# Patient Record
Sex: FEMALE | Race: WHITE
[De-identification: names, ages, dates, MRNs, and addresses within clinical notes are randomized per-mention and may not be internally consistent; named-entity substitution may affect disease eponyms.]

---

## 2017-05-21 ENCOUNTER — HOSPITAL ENCOUNTER (EMERGENCY)
Dept: HOSPITAL 62 - ER | Age: 42
Discharge: HOME | End: 2017-05-21
Payer: MEDICAID

## 2017-05-21 VITALS — DIASTOLIC BLOOD PRESSURE: 87 MMHG | SYSTOLIC BLOOD PRESSURE: 137 MMHG
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
## 2017-05-21 DIAGNOSIS — Z82.5: ICD-10-CM

## 2017-05-21 DIAGNOSIS — R03.0: ICD-10-CM

## 2017-05-21 DIAGNOSIS — R06.02: ICD-10-CM

## 2017-05-21 DIAGNOSIS — F17.210: ICD-10-CM

## 2017-05-21 DIAGNOSIS — R51: ICD-10-CM

## 2017-05-21 DIAGNOSIS — S31.154A: ICD-10-CM

## 2017-05-21 DIAGNOSIS — R06.2: ICD-10-CM

## 2017-05-21 DIAGNOSIS — R05: Primary | ICD-10-CM

## 2017-05-21 DIAGNOSIS — H92.09: ICD-10-CM

## 2017-05-21 DIAGNOSIS — Z91.09: ICD-10-CM

## 2017-05-21 DIAGNOSIS — Z84.89: ICD-10-CM

## 2017-05-21 PROCEDURE — 99283 EMERGENCY DEPT VISIT LOW MDM: CPT

## 2017-05-21 NOTE — ER DOCUMENT REPORT
HPI





- HPI


Patient complains to provider of: bug bite, cough


Onset: Other - bug bite- this am, cough x 1 w


Severity: Moderate


Pain Level: 3


Context: 





Patient presents emergency department with complaints of bug bite to her lower 

abdomen that she noted this morning.  She also reports history of allergy 

induced asthma and has been feeling short of breath with wheezing for about a 

week.  She also reports she has ear pain.  She reports she was seen at the 

urgent care on Friday approximately 1 week ago for knee but did not mention 

wheeze at that time.  She denies other symptoms such as fever vomiting 

diarrhea.  She reports she was at the [employer] and thinks there is a lot of 

dust there and she probably should more think because she has asthma. Reports 

she is using her nebs without relief of sx. Also thinks she may have a sinus 

infection because she has been having headaches on/off.








 


Associated Symptoms: Nonproductive cough, Earache


Exacerbated by: Denies


Relieved by: Denies


Similar symptoms previously: Yes


Recently seen / treated by doctor: Yes





- REPRODUCTIVE


Reproductive: DENIES: Pregnant:





- DERM


Skin Color: Normal





Past Medical History





- General


Information source: Patient


Last Menstrual Period: irregular





- Social History


Smoking Status: Current Every Day Smoker


Cigarette use (# per day): Yes


Chew tobacco use (# tins/day): No


Frequency of alcohol use: None


Drug Abuse: None


Occupation: [employer]


Lives with: Family


Family History: Reviewed & Not Pertinent, Hypertension, Malignancy, Other - 

Sister has allergy and asthma


Patient has suicidal ideation: No


Patient has homicidal ideation: No





- Past Medical History


Cardiac Medical History: Reports: Hx Hypertension


Pulmonary Medical History: Reports: Hx Asthma, Hx Bronchitis


Endocrine Medical History: Reports: Hx Diabetes Mellitus Type 2 - borderline


Renal/ Medical History: Reports: Hx Kidney Stones.  Denies: Hx Peritoneal 

Dialysis


Musculoskeltal Medical History: Reports Hx Arthritis, Reports Hx 

Musculoskeletal Deformity


Past Surgical History: Reports: Hx Kidney (Renal Surgery) - lithotripsy





- Immunizations


Immunizations up to date: Yes


Hx Diphtheria, Pertussis, Tetanus Vaccination: No - >5 yrs





Vertical Provider Document





- CONSTITUTIONAL


Agree With Documented VS: Yes


Exam Limitations: No Limitations


General Appearance: WD/WN, No Apparent Distress - nontoxic looking





- INFECTION CONTROL


TRAVEL OUTSIDE OF THE [place] IN LAST 30 DAYS: No





- HEENT


HEENT: Atraumatic, Normocephalic.  negative: Conjuctival Injection, Pharyngeal 

Exudate, Pharyngeal Erythema - No peritonsillar abscess good clear voice no 

trismus, Tympanic Membrane Red, Tympanic Membrane Bulging





- NECK


Neck: Normal Inspection, Supple.  negative: Lymphadenopathy-Left, 

Lymphadenopathy-Right





- RESPIRATORY


Respiratory: Breath Sounds Normal, No Respiratory Distress - No cough noted 

during entire interview and assessment respiratory rate even and unlabored no 

shortness of breath no wheezing no rhonchi speaks in a clear voice, sentences 

with out difficulty,.  negative: Rhonchi, Wheezing


O2 Sat by Pulse Oximetry: 99





- CARDIOVASCULAR


Cardiovascular: Regular Rate, Regular Rhythm





- GI/ABDOMEN


Gastrointestinal: Abdomen Soft, Abdomen Non-Tender





- MUSCULOSKELETAL/EXTREMETIES


Musculoskeletal/Extremeties: MASONIYA, FROM





- NEURO


Level of Consciousness: Awake, Alert, Appropriate


Motor/Sensory: No Motor Deficit





- DERM


Integumentary: Warm, Dry


Adult Front & Back Diagram: 


  __________________________














  __________________________





 1 - Erythema noted ~ 1 cmm round, no pustule, no induration no warmth or 

swelling no drainage








Course





- Re-evaluation


Re-evalutation: 





05/21/17  


Pt instructed on importance of quit smoking.  Patient also instructed to 

monitor her insect bite for signs and symptoms of abscess.  She verbalized 

understanding.  She also was instructed to follow-up with primary care provider 

this week.  She verbalized understanding.





- Vital Signs


Vital signs: 


 











Temp Pulse Resp BP Pulse Ox


 


 97.8 F   90   18   137/87 H  99 


 


 05/21/17 10:32  05/21/17 10:32  05/21/17 10:32  05/21/17 10:32  05/21/17 10:32














Discharge





- Discharge


Clinical Impression: 


 Cough, Elevated blood pressure reading





Bug bite


Qualifiers:


 Encounter type: initial encounter Qualified Code(s): W57.XXXA - Bitten or 

stung by nonvenomous insect and other nonvenomous arthropods, initial encounter





Condition: Stable


Disposition: HOME, SELF-CARE


Instructions:  Insect Bites ([facility])


Additional Instructions: 


*You have been treated for a insect bite and cough, history of allergy induced 

asthma, elevated blood pressure reading


*Use your inhaler as prescribed, take antihistamines as indicated


*Monitor the site for signs of  infection such as   pain, redness, swelling, 

warmth


*Keep the insect bite site clean, apply bacitracin


*Follow up with your primary care provider within one week


*Return to ED for signs of increasing infection, worsening condition,


  changes, needs














Monitor your blood pressure. Your blood pressure was elevated today.  This may 

be because you were anxious, in pain or because you need medication.  It is 

important to follow up with your primary care provider for full evaluation.


Forms:  Elevated Blood Pressure


Referrals: 


ZULMA ARIZMENID MD [Primary Care Provider] - Follow up as needed

## 2017-07-18 ENCOUNTER — HOSPITAL ENCOUNTER (EMERGENCY)
Dept: HOSPITAL 62 - ER | Age: 42
Discharge: HOME | End: 2017-07-18
Payer: COMMERCIAL

## 2017-07-18 VITALS — DIASTOLIC BLOOD PRESSURE: 85 MMHG | SYSTOLIC BLOOD PRESSURE: 135 MMHG

## 2017-07-18 DIAGNOSIS — Z87.891: ICD-10-CM

## 2017-07-18 DIAGNOSIS — J45.909: ICD-10-CM
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
## 2017-07-18 DIAGNOSIS — I10: ICD-10-CM

## 2017-07-18 DIAGNOSIS — S05.91XA: Primary | ICD-10-CM
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
## 2017-07-18 PROCEDURE — 99283 EMERGENCY DEPT VISIT LOW MDM: CPT

## 2017-07-18 NOTE — ER DOCUMENT REPORT
HPI





- HPI


Patient complains to provider of: poked eye with balloon edge


Onset: Just prior to arrival


Onset/Duration: Sudden


Pain Level: 3


Context: 


40 yo female poked right eye with mular unblown up balloon edge prior to 

arrival causing eye discomfort. Contact lense wearer but not today. NKA.


Associated Symptoms: None


Exacerbated by: Denies


Relieved by: Denies


Similar symptoms previously: No


Recently seen / treated by doctor: No





- ROS


ROS below otherwise negative: Yes


Systems Reviewed and Negative: Yes All other systems reviewed and negative





- CARDIOVASCULAR


Cardiovascular: DENIES: Chest pain





- REPRODUCTIVE


LMP: 6/27/17


Reproductive: DENIES: Pregnant:





Past Medical History





- General


Information source: Patient





- Social History


Smoking Status: Former Smoker


Frequency of alcohol use: None


Drug Abuse: None


Lives with: Family


Family History: Reviewed & Not Pertinent, Hypertension, Malignancy, Other - 

Sister has allergy and asthma





- Past Medical History


Cardiac Medical History: Reports: Hx Hypertension


Pulmonary Medical History: Reports: Hx Asthma, Hx Bronchitis


Endocrine Medical History: Reports: Hx Diabetes Mellitus Type 2 - borderline


Renal/ Medical History: Reports: Hx Kidney Stones.  Denies: Hx Peritoneal 

Dialysis


Musculoskeltal Medical History: Reports Hx Arthritis, Reports Hx 

Musculoskeletal Deformity


Past Surgical History: Reports: Hx Kidney (Renal Surgery) - lithotripsy





- Immunizations


Immunizations up to date: Yes


Hx Diphtheria, Pertussis, Tetanus Vaccination: No - >5 yrs





Vertical Provider Document





- CONSTITUTIONAL


Agree With Documented VS: Yes


Exam Limitations: No Limitations


General Appearance: No Apparent Distress





- INFECTION CONTROL


TRAVEL OUTSIDE OF THE [place] IN LAST 30 DAYS: No





- HEENT


HEENT: Atraumatic, PERRLA.  negative: Conjuctival Injection


Notes: 


no fb, no fluorescein uptake, anterior chamber normal.





- NECK


Neck: Supple





- RESPIRATORY


O2 Sat by Pulse Oximetry: 95





- NEURO


Level of Consciousness: Awake, Alert, Appropriate





- DERM


Integumentary: Warm, Dry, No Rash





Course





- Vital Signs


Vital signs: 


 











Temp Pulse Resp BP Pulse Ox


 


 98.4 F   79   16   137/89 H  95 


 


 07/18/17 13:20  07/18/17 13:20  07/18/17 13:20  07/18/17 13:20  07/18/17 13:20














Discharge





- Discharge


Clinical Impression: 


Right eye injury


Qualifiers:


 Encounter type: initial encounter Qualified Code(s): S05.91XA - Unspecified 

injury of right eye and orbit, initial encounter





Condition: Good


Disposition: HOME, SELF-CARE


Instructions:  Eye Injury ([facility]), Eyedrop Use ([facility]), Ketorolac Tromethamine Eye 

Drops ([facility]), Sulfa Medications ([facility])


Additional Instructions: 


no contacts until eye is normal


1 ketorolac eye drop into right eye every 8 hours until gone (1 day)


don't rub eye


eye drops for 2 days


see eye doctor if swelling, red, haziness, persistant problems after 24 hours





Please complete the patient satisfaction survey if you get one, and return it.. 

If you do not receive a survey,  then you can go to the [facility] website, [email or web address] 

and place your comments about your very good care. Thank you very much. It was 

a pleasure being your medical provider today.


Prescriptions: 


Sulfacetamide Sodium [Bleph-10] 2 drop OU QID #5 ml


Referrals: 


ZULMA ARIZMENDI MD [Primary Care Provider] - Follow up as needed


ARIADNA DENNISON MD [ACTIVE STAFF] - Follow up as needed

## 2017-07-18 NOTE — ER DOCUMENT REPORT
ED Medical Screen (RME)





- General


Chief Complaint: Eye Injury


Stated Complaint: RIGHT EYE INJURY


Time Seen by Provider: 07/18/17 13:27


Information source: Patient


Notes: 


41-year-old female who hit her right eye with a deflated edge of a balloon.  

She wears contacts but currently is not wearing any contacts.  She states some 

pain to her right eye.  She denies blurry vision.  Patient's tetanus is up-to-

date.





We will obtain a visual acuity check, slit-lamp and fluorescein check with 

Wood[place] lamp.


TRAVEL OUTSIDE OF THE [place] IN LAST 30 DAYS: No





- Related Data


Allergies/Adverse Reactions: 


 





No Known Allergies Allergy (Verified 05/21/17 10:32)


 











Past Medical History





- Social History


Family history: Reviewed & Not Pertinent





- Past Medical History


Cardiac Medical History: Reports: Hx Hypertension


Pulmonary Medical History: Reports: Hx Asthma, Hx Bronchitis


Endocrine Medical History: Reports: Hx Diabetes Mellitus Type 2 - borderline


Renal/ Medical History: Reports: Hx Kidney Stones.  Denies: Hx Peritoneal 

Dialysis


Musculoskeltal Medical History: Reports Hx Arthritis, Reports Hx 

Musculoskeletal Deformity


Past Surgical History: Reports: Hx Kidney (Renal Surgery) - lithotripsy





- Immunizations


Immunizations up to date: Yes


Hx Diphtheria, Pertussis, Tetanus Vaccination: No - >5 yrs





Physical Exam





- Vital signs


Vitals: 





 











Temp Pulse Resp BP Pulse Ox


 


 98.4 F   79   16   137/89 H  95 


 


 07/18/17 13:20  07/18/17 13:20  07/18/17 13:20  07/18/17 13:20  07/18/17 13:20














Course





- Vital Signs


Vital signs: 





 











Temp Pulse Resp BP Pulse Ox


 


 98.4 F   79   16   137/89 H  95 


 


 07/18/17 13:20  07/18/17 13:20  07/18/17 13:20  07/18/17 13:20  07/18/17 13:20

## 2017-08-28 ENCOUNTER — HOSPITAL ENCOUNTER (EMERGENCY)
Dept: HOSPITAL 62 - ER | Age: 42
Discharge: HOME | End: 2017-08-28
Payer: MEDICAID

## 2017-08-28 VITALS — DIASTOLIC BLOOD PRESSURE: 60 MMHG | SYSTOLIC BLOOD PRESSURE: 118 MMHG

## 2017-08-28 DIAGNOSIS — J06.9: ICD-10-CM

## 2017-08-28 DIAGNOSIS — Z88.7: ICD-10-CM

## 2017-08-28 DIAGNOSIS — R05: ICD-10-CM

## 2017-08-28 DIAGNOSIS — E11.9: ICD-10-CM

## 2017-08-28 DIAGNOSIS — N39.0: Primary | ICD-10-CM

## 2017-08-28 DIAGNOSIS — R10.11: ICD-10-CM

## 2017-08-28 DIAGNOSIS — I10: ICD-10-CM

## 2017-08-28 DIAGNOSIS — Z87.442: ICD-10-CM

## 2017-08-28 DIAGNOSIS — J45.909: ICD-10-CM

## 2017-08-28 LAB
ALBUMIN SERPL-MCNC: 4.1 G/DL (ref 3.5–5)
ALP SERPL-CCNC: 83 U/L (ref 38–126)
ALT SERPL-CCNC: 29 U/L (ref 9–52)
ANION GAP SERPL CALC-SCNC: 11 MMOL/L (ref 5–19)
APPEARANCE UR: (no result)
AST SERPL-CCNC: 26 U/L (ref 14–36)
BASOPHILS # BLD AUTO: 0.1 10^3/UL (ref 0–0.2)
BASOPHILS NFR BLD AUTO: 0.7 % (ref 0–2)
BILIRUB DIRECT SERPL-MCNC: 0.4 MG/DL (ref 0–0.4)
BILIRUB SERPL-MCNC: 0.4 MG/DL (ref 0.2–1.3)
BILIRUB UR QL STRIP: NEGATIVE
BUN SERPL-MCNC: 12 MG/DL (ref 7–20)
CALCIUM: 9.3 MG/DL (ref 8.4–10.2)
CHLORIDE SERPL-SCNC: 102 MMOL/L (ref 98–107)
CO2 SERPL-SCNC: 26 MMOL/L (ref 22–30)
CREAT SERPL-MCNC: 0.68 MG/DL (ref 0.52–1.25)
EOSINOPHIL # BLD AUTO: 0.9 10^3/UL (ref 0–0.6)
EOSINOPHIL NFR BLD AUTO: 8.9 % (ref 0–6)
ERYTHROCYTE [DISTWIDTH] IN BLOOD BY AUTOMATED COUNT: 16.9 % (ref 11.5–14)
GLUCOSE SERPL-MCNC: 88 MG/DL (ref 75–110)
GLUCOSE UR STRIP-MCNC: NEGATIVE MG/DL
HCT VFR BLD CALC: 31.8 % (ref 36–47)
HGB BLD-MCNC: 10.2 G/DL (ref 12–15.5)
HGB HCT DIFFERENCE: -1.2
KETONES UR STRIP-MCNC: NEGATIVE MG/DL
LIPASE SERPL-CCNC: 69.9 U/L (ref 23–300)
LYMPHOCYTES # BLD AUTO: 2.6 10^3/UL (ref 0.5–4.7)
LYMPHOCYTES NFR BLD AUTO: 24.9 % (ref 13–45)
MCH RBC QN AUTO: 24.6 PG (ref 27–33.4)
MCHC RBC AUTO-ENTMCNC: 32.1 G/DL (ref 32–36)
MCV RBC AUTO: 77 FL (ref 80–97)
MONOCYTES # BLD AUTO: 1.1 10^3/UL (ref 0.1–1.4)
MONOCYTES NFR BLD AUTO: 10.7 % (ref 3–13)
NEUTROPHILS # BLD AUTO: 5.7 10^3/UL (ref 1.7–8.2)
NEUTS SEG NFR BLD AUTO: 54.8 % (ref 42–78)
NITRITE UR QL STRIP: NEGATIVE
PH UR STRIP: 5 [PH] (ref 5–9)
POTASSIUM SERPL-SCNC: 4.4 MMOL/L (ref 3.6–5)
PROT SERPL-MCNC: 7.3 G/DL (ref 6.3–8.2)
PROT UR STRIP-MCNC: NEGATIVE MG/DL
RBC # BLD AUTO: 4.14 10^6/UL (ref 3.72–5.28)
SODIUM SERPL-SCNC: 138.8 MMOL/L (ref 137–145)
SP GR UR STRIP: 1.02
UROBILINOGEN UR-MCNC: NEGATIVE MG/DL (ref ?–2)
WBC # BLD AUTO: 10.4 10^3/UL (ref 4–10.5)

## 2017-08-28 PROCEDURE — 94640 AIRWAY INHALATION TREATMENT: CPT

## 2017-08-28 PROCEDURE — 76705 ECHO EXAM OF ABDOMEN: CPT

## 2017-08-28 PROCEDURE — 99284 EMERGENCY DEPT VISIT MOD MDM: CPT

## 2017-08-28 PROCEDURE — 81001 URINALYSIS AUTO W/SCOPE: CPT

## 2017-08-28 PROCEDURE — 81025 URINE PREGNANCY TEST: CPT

## 2017-08-28 PROCEDURE — 80053 COMPREHEN METABOLIC PANEL: CPT

## 2017-08-28 PROCEDURE — 36415 COLL VENOUS BLD VENIPUNCTURE: CPT

## 2017-08-28 PROCEDURE — 87086 URINE CULTURE/COLONY COUNT: CPT

## 2017-08-28 PROCEDURE — 85025 COMPLETE CBC W/AUTO DIFF WBC: CPT

## 2017-08-28 PROCEDURE — 83690 ASSAY OF LIPASE: CPT

## 2017-08-28 NOTE — RADIOLOGY REPORT (SQ)
EXAM DESCRIPTION:  U/S ABDOMEN LIMITED W/O DOP



COMPLETED DATE/TIME:  8/28/2017 9:01 pm



REASON FOR STUDY:  ruq pain



COMPARISON:  None.



TECHNIQUE:  Dynamic and static grayscale images acquired of the abdomen and recorded on PACS. Additio
nal selected color Doppler and spectral images recorded.



LIMITATIONS:  None.



FINDINGS:  PANCREAS: Pancreas not seen.

LIVER: No masses. Echotexture normal.

LIVER VASCULATURE: Normal directional flow of the main portal vein and hepatic veins.

GALLBLADDER: No stones. Normal wall thickness. No pericholecystic fluid.

ULTRASOUND-DETECTED SIEGEL'S SIGN: Negative.

INTRAHEPATIC DUCTS AND COMMON DUCT: CBD and intrahepatic ducts normal caliber. No filling defects.

INFERIOR VENA CAVA: Normal flow.

AORTA: No aneurysm.

RIGHT KIDNEY:  Normal size. Normal echogenicity. No solid or suspicious masses. No hydronephrosis. No
 calcifications.

PERITONEAL AND RIGHT PLEURAL SPACE: No ascites or effusions.

OTHER: No other significant findings.



IMPRESSION:  NORMAL RIGHT UPPER QUADRANT ULTRASOUND.



TECHNICAL DOCUMENTATION:  JOB ID:  [id number]

 2011 [employer]- All Rights Reserved

## 2017-08-28 NOTE — ER DOCUMENT REPORT
ED GI/





- General


Chief Complaint: Abdominal Pain


Stated Complaint: WHEZZING, STOMACH PAIN


Time Seen by Provider: 08/28/17 19:02


Mode of Arrival: Ambulatory


Information source: Patient


Notes: 





Patient presents complaining of right upper quadrant abdominal pain for the 

past 3 days.  Patient states that she has had a productive cough for the same 

timeframe.  Patient denies any urinary symptoms, nausea, vomiting, or diarrhea.

  Patient saw her primary doctor and had outpatient lab work performed.  

Patient states that she was supposed to have an outpatient ultrasound performed 

but whenever she went to the diagnostic imaging center they said she had to 

have an appointment.  Patient went to the radiology department here and was 

told that she needed a different type of order.


TRAVEL OUTSIDE OF THE [place] IN LAST 30 DAYS: No





- HPI


Patient complains to provider of: Abdominal pain


Onset: Other - 3 days


Timing/Duration: Persistent


Quality of pain: Sharp


Pain Level: 4


Location: RUQ


Vaginal bleeding (Compared to normal period): None


Associated symptoms: denies: Diarrhea, Dysuria, Nausea, Shortness of breath, 

Urinary hesitancy, Urinary frequency, Urinary retention, Urinary urgency, 

Vaginal discharge, Vomiting


Exacerbated by: Coughing


Relieved by: Denies


Similar symptoms previously: No


Recently seen / treated by doctor: Yes





- Related Data


Allergies/Adverse Reactions: 


 





flu vaccine Allergy (Uncoded 08/28/17 18:56)


 











Past Medical History





- General


Information source: Patient





- Social History


Smoking Status: Never Smoker


Chew tobacco use (# tins/day): No


Frequency of alcohol use: None


Drug Abuse: None


Occupation: retail


Lives with: Family


Family History: Reviewed & Not Pertinent, Hypertension, Malignancy, Other - 

Sister has allergy and asthma





- Past Medical History


Cardiac Medical History: Reports: Hx Hypertension


Pulmonary Medical History: Reports: Hx Asthma, Hx Bronchitis


Endocrine Medical History: Reports: Hx Diabetes Mellitus Type 2 - diet 

controlled


Renal/ Medical History: Reports: Hx Kidney Stones.  Denies: Hx Peritoneal 

Dialysis


Musculoskeltal Medical History: Reports Hx Arthritis, Reports Hx 

Musculoskeletal Deformity


Past Surgical History: Reports: Hx Kidney (Renal Surgery) - lithotripsy





- Immunizations


Immunizations up to date: Yes


Hx Diphtheria, Pertussis, Tetanus Vaccination: No - >5 yrs





Review of Systems





- Review of Systems


Constitutional: No symptoms reported


EENT: No symptoms reported


Cardiovascular: No symptoms reported.  denies: Chest pain


Respiratory: Cough, Sputum.  denies: Short of breath


Gastrointestinal: Abdominal pain.  denies: Diarrhea, Nausea, Vomiting


Genitourinary: No symptoms reported.  denies: Dysuria, Flank pain


Female Genitourinary: No symptoms reported


Musculoskeletal: No symptoms reported.  denies: Back pain


Skin: No symptoms reported


Hematologic/Lymphatic: No symptoms reported


Neurological/Psychological: No symptoms reported





Physical Exam





- Vital signs


Vitals: 


 











Temp Pulse Resp BP Pulse Ox


 


 98.0 F   80   20   141/84 H  97 


 


 08/28/17 18:30  08/28/17 18:30  08/28/17 18:30  08/28/17 18:30  08/28/17 18:30














- General


General appearance: Appears well, Alert


In distress: None





- HEENT


Head: Normocephalic, Atraumatic


Eyes: Normal


Conjunctiva: Normal


Nasal: Normal


Mouth/Lips: Normal


Mucous membranes: Normal


Neck: Normal, Supple.  No: Lymphadenopathy





- Respiratory


Respiratory status: No respiratory distress


Chest status: Pain with cough


Breath sounds: Nonproductive cough, Wheezing


Chest palpation: Normal





- Cardiovascular


Rhythm: Regular


Heart sounds: S1 appreciated, S2 appreciated


Murmur: No





- Abdominal


Inspection: Obese


Distension: No distension


Bowel sounds: Normal


Tenderness: Tender - Right upper quadrant tenderness


Organomegaly: No organomegaly





- Back


Back: Normal, Nontender





- Extremities


General upper extremity: Normal inspection, Normal ROM


General lower extremity: Normal inspection, Normal ROM





- Neurological


Neuro grossly intact: Yes


Cognition: Normal


[place] Coma Scale Eye Opening: Spontaneous


Juana Coma Scale Verbal: Oriented


[place] Coma Scale Motor: Obeys Commands


[place] Coma Scale Total: 15





- Psychological


Associated symptoms: Normal affect, Normal mood





- Skin


Skin Temperature: Warm


Skin Moisture: Dry


Skin Color: Normal





Course





- Re-evaluation


Re-evalutation: 





08/28/17 22:21


Wheezing resolved after nebulizer treatment, patient with good air movement 

bilaterally.





Patient advised of urinalysis results.  Patient states she has had some 

frequency with occasional dysuria.


08/28/17 22:22


Patient reports abdominal pain resolved after Percocet.  Patient with decreased 

cough frequency now.  





- Vital Signs


Vital signs: 


 











Temp Pulse Resp BP Pulse Ox


 


 97.0 F   98   16   118/60   94 


 


 08/28/17 23:00  08/28/17 23:03  08/28/17 23:00  08/28/17 23:00  08/28/17 23:03














- Laboratory


Result Diagrams: 


 08/28/17 20:10





 08/28/17 20:10


Laboratory results interpreted by me: 


 











  08/28/17 08/28/17





  19:10 20:10


 


Hgb   10.2 L


 


Hct   31.8 L


 


MCV   77 L


 


MCH   24.6 L


 


RDW   16.9 H


 


Eosinophils %   8.9 H


 


Absolute Eosinophils   0.9 H


 


Ur Leukocyte Esterase  MODERATE H 











08/29/17 03:24





 Labs- Entire Visit











  08/28/17 08/28/17 08/28/17





  19:10 20:10 20:10


 


WBC   10.4 


 


RBC   4.14 


 


Hgb   10.2 L 


 


Hct   31.8 L 


 


MCV   77 L 


 


MCH   24.6 L 


 


MCHC   32.1 


 


RDW   16.9 H 


 


Plt Count   362 


 


Seg Neutrophils %   54.8 


 


Lymphocytes %   24.9 


 


Monocytes %   10.7 


 


Eosinophils %   8.9 H 


 


Basophils %   0.7 


 


Absolute Neutrophils   5.7 


 


Absolute Lymphocytes   2.6 


 


Absolute Monocytes   1.1 


 


Absolute Eosinophils   0.9 H 


 


Absolute Basophils   0.1 


 


Sodium    138.8


 


Potassium    4.4


 


Chloride    102


 


Carbon Dioxide    26


 


Anion Gap    11


 


BUN    12


 


Creatinine    0.68


 


Est GFR ( Amer)    > 60


 


Est GFR (Non-Af Amer)    > 60


 


Glucose    88


 


Calcium    9.3


 


Total Bilirubin    0.4


 


Direct Bilirubin    0.4


 


Indirect Bilirubin    Not Reportable


 


Neonat Total Bilirubin    Not Reportable


 


AST    26


 


ALT    29


 


Alkaline Phosphatase    83


 


Total Protein    7.3


 


Albumin    4.1


 


Lipase    69.9


 


Urine Color  YELLOW  


 


Urine Appearance  SLIGHTLY-CLOUDY  


 


Urine pH  5.0  


 


Ur Specific Gravity  1.019  


 


Urine Protein  NEGATIVE  


 


Urine Glucose (UA)  NEGATIVE  


 


Urine Ketones  NEGATIVE  


 


Urine Blood  NEGATIVE  


 


Urine Nitrite  NEGATIVE  


 


Urine Bilirubin  NEGATIVE  


 


Urine Urobilinogen  NEGATIVE  


 


Ur Leukocyte Esterase  MODERATE H  


 


Urine WBC (Auto)  6  


 


Urine RBC (Auto)  1  


 


Urine Bacteria (Auto)  TRACE  


 


Squamous Epi Cells Auto  6  


 


Urine Mucus (Auto)  RARE  


 


Urine Ascorbic Acid  NEGATIVE  


 


Urine HCG, Qual  NEGATIVE  














- Diagnostic Test


Radiology reviewed: Reports reviewed





Discharge





- Discharge


Clinical Impression: 


 Elevated blood pressure reading, Wheezing





Upper respiratory infection


Qualifiers:


 URI type: unspecified URI Qualified Code(s): J06.9 - Acute upper respiratory 

infection, unspecified





UTI (urinary tract infection)


Qualifiers:


 Urinary tract infection type: site unspecified Hematuria presence: without 

hematuria Qualified Code(s): N39.0 - Urinary tract infection, site not specified





Abdominal pain


Qualifiers:


 Abdominal location: right upper quadrant Qualified Code(s): R10.11 - Right 

upper quadrant pain





Condition: Stable


Disposition: HOME, SELF-CARE


Instructions:  Abdominal Pain ([facility]), Chest Wall Pain ([facility]), Upper Respiratory 

Illness ([facility]), Urinary Tract Infection ([facility])


Additional Instructions: 


Return immediately for any new or worsening symptoms





Followup with your primary care provider, call tomorrow to make a followup 

appointment





Take your oral steroid and nebulizer treatments as prescribed to help with your 

cough and wheezing symptoms.


Prescriptions: 


Sulfamethoxazole/Trimethoprim [Bactrim Ds Tablet] 1 each PO BID #6 tablet


Forms:  Elevated Blood Pressure, Return to Work


Referrals: 


VERO JEROME MD [Primary Care Provider] - Follow up as needed


[facility] [Provider Group] - Follow up tomorrow

## 2017-08-28 NOTE — ER DOCUMENT REPORT
ED Medical Screen (RME)





- General


Chief Complaint: Abdominal Pain


Stated Complaint: WHEZZING, STOMACH PAIN


Time Seen by Provider: 08/28/17 19:02


Notes: 





Patient presents stating she is having right upper quadrant pain.  She states 

she was evaluated by her primary care physician today who did blood work.  She 

states the results of the blood work are not currently available and was told 

it would not be available for several days.  She states that her primary care 

physician also instructed her to have a right upper quadrant ultrasound today.  

She states she came over to the hospital to have it done but they told her that 

she needed to have another doctor approve the tests so she came the emergency 

department.  Patient states she was diagnosed today as well with some upper 

respiratory infection by her primary provider and was prescribed antibiotics.  

Patient has had some nausea.


TRAVEL OUTSIDE OF THE [place] IN LAST 30 DAYS: No





- Related Data


Allergies/Adverse Reactions: 


 





flu vaccine Allergy (Uncoded 08/28/17 18:56)


 











Past Medical History





- Social History


Chew tobacco use (# tins/day): No


Frequency of alcohol use: None


Drug Abuse: None


Family history: Reviewed & Not Pertinent





- Past Medical History


Cardiac Medical History: Reports: Hx Hypertension


Pulmonary Medical History: Reports: Hx Asthma, Hx Bronchitis


Endocrine Medical History: Reports: Hx Diabetes Mellitus Type 2 - diet 

controlled


Renal/ Medical History: Reports: Hx Kidney Stones.  Denies: Hx Peritoneal 

Dialysis


Musculoskeltal Medical History: Reports Hx Arthritis, Reports Hx 

Musculoskeletal Deformity


Past Surgical History: Reports: Hx Kidney (Renal Surgery) - lithotripsy





- Immunizations


Immunizations up to date: Yes


Hx Diphtheria, Pertussis, Tetanus Vaccination: No - >5 yrs





Physical Exam





- Vital signs


Vitals: 





 











Temp Pulse Resp BP Pulse Ox


 


 98.0 F   80   20   141/84 H  97 


 


 08/28/17 18:30  08/28/17 18:30  08/28/17 18:30  08/28/17 18:30  08/28/17 18:30














Course





- Vital Signs


Vital signs: 





 











Temp Pulse Resp BP Pulse Ox


 


 98.0 F   80   20   141/84 H  97 


 


 08/28/17 18:30  08/28/17 18:30  08/28/17 18:30  08/28/17 18:30  08/28/17 18:30